# Patient Record
Sex: FEMALE | Race: BLACK OR AFRICAN AMERICAN | NOT HISPANIC OR LATINO | URBAN - METROPOLITAN AREA
[De-identification: names, ages, dates, MRNs, and addresses within clinical notes are randomized per-mention and may not be internally consistent; named-entity substitution may affect disease eponyms.]

---
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
## 2025-01-18 ENCOUNTER — OFFICE VISIT (OUTPATIENT)
Dept: URGENT CARE | Age: 20
End: 2025-01-18
Payer: COMMERCIAL

## 2025-01-18 VITALS
OXYGEN SATURATION: 97 % | SYSTOLIC BLOOD PRESSURE: 106 MMHG | RESPIRATION RATE: 16 BRPM | HEART RATE: 80 BPM | DIASTOLIC BLOOD PRESSURE: 79 MMHG | TEMPERATURE: 98.5 F

## 2025-01-18 DIAGNOSIS — N30.01 ACUTE CYSTITIS WITH HEMATURIA: Primary | ICD-10-CM

## 2025-01-18 DIAGNOSIS — R35.0 URINARY FREQUENCY: ICD-10-CM

## 2025-01-18 DIAGNOSIS — R31.9 HEMATURIA, UNSPECIFIED TYPE: ICD-10-CM

## 2025-01-18 LAB
POC APPEARANCE, URINE: ABNORMAL
POC BILIRUBIN, URINE: NEGATIVE
POC BLOOD, URINE: ABNORMAL
POC COLOR, URINE: ABNORMAL
POC GLUCOSE, URINE: NEGATIVE MG/DL
POC KETONES, URINE: NEGATIVE MG/DL
POC LEUKOCYTES, URINE: ABNORMAL
POC NITRITE,URINE: NEGATIVE
POC PH, URINE: 6 PH
POC PROTEIN, URINE: ABNORMAL MG/DL
POC SPECIFIC GRAVITY, URINE: 1.02
POC UROBILINOGEN, URINE: 0.2 EU/DL
PREGNANCY TEST URINE, POC: NEGATIVE

## 2025-01-18 RX ORDER — PHENAZOPYRIDINE HYDROCHLORIDE 200 MG/1
200 TABLET, FILM COATED ORAL 3 TIMES DAILY PRN
Qty: 10 TABLET | Refills: 0 | Status: SHIPPED | OUTPATIENT
Start: 2025-01-18 | End: 2025-01-21

## 2025-01-18 RX ORDER — NITROFURANTOIN 25; 75 MG/1; MG/1
100 CAPSULE ORAL 2 TIMES DAILY
Qty: 10 CAPSULE | Refills: 0 | Status: SHIPPED | OUTPATIENT
Start: 2025-01-18 | End: 2025-01-23

## 2025-01-18 ASSESSMENT — ENCOUNTER SYMPTOMS
VOMITING: 0
NAUSEA: 0
FEVER: 0
DYSURIA: 1
FLANK PAIN: 1
ABDOMINAL PAIN: 1

## 2025-01-18 NOTE — PROGRESS NOTES
Subjective   Patient ID: Sylwia Aguilar is a 19 y.o. female. They present today with a chief complaint of hematuria today (Family history kidney stones).    History of Present Illness  19-syear-old present with flank pain radiating to the stomach and down to the groins rated 10/10. Pt was concerned for kidney stones as she had family history.       History provided by:  Patient   used: No    Difficulty Urinating  Pain quality:  Burning  Pain severity:  Severe  Onset quality:  Sudden  Duration:  2 days  Timing:  Constant  Progression:  Worsening  Chronicity:  New  Recent urinary tract infections: no    Relieved by:  Nothing  Worsened by:  Nothing  Ineffective treatments:  None tried  Urinary symptoms: frequent urination and hematuria    Urinary symptoms: no discolored urine, no foul-smelling urine, no hesitancy and no bladder incontinence    Associated symptoms: abdominal pain and flank pain    Associated symptoms: no fever, no genital lesions, no nausea, no vaginal discharge and no vomiting        Past Medical History  Allergies as of 01/18/2025 - Reviewed 01/18/2025   Allergen Reaction Noted    Doxycycline Rash 09/28/2024       (Not in a hospital admission)       History reviewed. No pertinent past medical history.    History reviewed. No pertinent surgical history.     reports that she has never smoked. She has never used smokeless tobacco.    Review of Systems  Review of Systems   Constitutional:  Negative for fever.   Gastrointestinal:  Positive for abdominal pain. Negative for nausea and vomiting.   Genitourinary:  Positive for dysuria and flank pain. Negative for vaginal discharge.                                  Objective    Vitals:    01/18/25 1058   BP: 106/79   Pulse: 80   Resp: 16   Temp: 36.9 °C (98.5 °F)   SpO2: 97%     No LMP recorded.    Physical Exam  Vitals and nursing note reviewed.   Constitutional:       Appearance: Normal appearance.   HENT:      Head: Normocephalic and  atraumatic.   Cardiovascular:      Rate and Rhythm: Normal rate and regular rhythm.   Pulmonary:      Effort: Pulmonary effort is normal.      Breath sounds: Normal breath sounds.   Abdominal:      General: Abdomen is protuberant.      Palpations: Abdomen is soft.      Tenderness: There is abdominal tenderness in the suprapubic area. There is right CVA tenderness and left CVA tenderness.   Neurological:      Mental Status: She is alert.         Procedures    Point of Care Test & Imaging Results from this visit  Results for orders placed or performed in visit on 01/18/25   POCT UA Automated manually resulted   Result Value Ref Range    POC Color, Urine Red (A) Straw, Yellow, Light-Yellow    POC Appearance, Urine Cloudy (A) Clear    POC Glucose, Urine NEGATIVE NEGATIVE mg/dl    POC Bilirubin, Urine NEGATIVE NEGATIVE    POC Ketones, Urine NEGATIVE NEGATIVE mg/dl    POC Specific Gravity, Urine 1.020 1.005 - 1.035    POC Blood, Urine LARGE (3+) (A) NEGATIVE    POC PH, Urine 6.0 No Reference Range Established PH    POC Protein, Urine 15 (1+) (A) NEGATIVE mg/dl    POC Urobilinogen, Urine 0.2 0.2, 1.0 EU/DL    Poc Nitrite, Urine NEGATIVE NEGATIVE    POC Leukocytes, Urine MODERATE (2+) (A) NEGATIVE   POCT pregnancy, urine manually resulted   Result Value Ref Range    Preg Test, Ur Negative Negative      No results found.    Diagnostic study results (if any) were reviewed by JORGE Bustos.    Assessment/Plan   Allergies, medications, history, and pertinent labs/EKGs/Imaging reviewed by JORGE Bustos.     Medical Decision Making  Advised to go to the nearest ER for further evaluation of kidney stones as pt might need ultrasound of the urinary tracts.   Pt declined toradol IM.   Treated for UTI as pt had leukocytes and blood in urine.  Urine specimen was sent out for culture and sensitivity. Will adjust the antibiotic treatment as needed.   Take the antibiotic with food.  Eat yogurt or take probiotic once a day.   Symptoms should improve in 2 to 3 days.   --- Drink a lot of fluid, 3 to 4 quarts a day. Cranberry juice is especially recommended, in addition to large amounts of water.  --- Avoid alcohol caffeine, tea and carbonated beverages (Coke®, 7-Up®, etc). They tend to irritate the bladder.  --- Ibuprofen (Advil® or Motrin®) or acetaminophen (Tylenol®) may be used for temperature and/or discomfort.  To prevent severe infection, follow up immediately if you:  --- Develop severe back pain or lower abdominal pain.  --- Develop chills and fever.  --- Develop nausea or vomiting.  --- Have continued burning or discomfort with urination.  Pt verbalized understanding of the instructions and left in stable condition.      Orders and Diagnoses  Diagnoses and all orders for this visit:  Acute cystitis with hematuria  -     nitrofurantoin, macrocrystal-monohydrate, (Macrobid) 100 mg capsule; Take 1 capsule (100 mg) by mouth 2 times a day for 5 days.  -     phenazopyridine (Pyridium) 200 mg tablet; Take 1 tablet (200 mg) by mouth 3 times a day as needed for bladder spasms for up to 3 days.  Hematuria, unspecified type  -     POCT UA Automated manually resulted  -     POCT pregnancy, urine manually resulted  Urinary frequency  -     POCT UA Automated manually resulted  -     POCT pregnancy, urine manually resulted  -     phenazopyridine (Pyridium) 200 mg tablet; Take 1 tablet (200 mg) by mouth 3 times a day as needed for bladder spasms for up to 3 days.      Medical Admin Record      Patient disposition: ED    Electronically signed by JORGE Bustos  11:15 AM

## 2025-05-09 ENCOUNTER — OFFICE VISIT (OUTPATIENT)
Dept: URGENT CARE | Age: 20
End: 2025-05-09
Payer: COMMERCIAL

## 2025-05-09 VITALS
HEART RATE: 77 BPM | TEMPERATURE: 98.6 F | OXYGEN SATURATION: 98 % | DIASTOLIC BLOOD PRESSURE: 77 MMHG | RESPIRATION RATE: 16 BRPM | SYSTOLIC BLOOD PRESSURE: 125 MMHG

## 2025-05-09 DIAGNOSIS — N30.01 ACUTE CYSTITIS WITH HEMATURIA: Primary | ICD-10-CM

## 2025-05-09 LAB
POC APPEARANCE, URINE: ABNORMAL
POC BILIRUBIN, URINE: NEGATIVE
POC BLOOD, URINE: ABNORMAL
POC COLOR, URINE: YELLOW
POC GLUCOSE, URINE: NEGATIVE MG/DL
POC KETONES, URINE: NEGATIVE MG/DL
POC LEUKOCYTES, URINE: ABNORMAL
POC NITRITE,URINE: NEGATIVE
POC PH, URINE: 6 PH
POC PROTEIN, URINE: ABNORMAL MG/DL
POC SPECIFIC GRAVITY, URINE: 1.02
POC UROBILINOGEN, URINE: 0.2 EU/DL

## 2025-05-09 RX ORDER — NITROFURANTOIN 25; 75 MG/1; MG/1
100 CAPSULE ORAL 2 TIMES DAILY
Qty: 10 CAPSULE | Refills: 0 | Status: SHIPPED | OUTPATIENT
Start: 2025-05-09 | End: 2025-05-14

## 2025-05-09 ASSESSMENT — ENCOUNTER SYMPTOMS
DYSURIA: 1
VOMITING: 0
FLANK PAIN: 0
FEVER: 0
NAUSEA: 0
ABDOMINAL PAIN: 1

## 2025-05-09 NOTE — PROGRESS NOTES
Subjective   Patient ID: Sylwia Aguilar is a 19 y.o. female. They present today with a chief complaint of urinary frequency, urgency.    History of Present Illness  Pt started metformin about a month. She recently started a new oral contraceptive. She noticed her UTI episodes become more frequent thereafter and requests for a referral to urologist.       History provided by:  Patient   used: No    Difficulty Urinating  Pain quality:  Burning  Pain severity:  Moderate  Onset quality:  Gradual  Duration:  3 days  Timing:  Constant  Progression:  Worsening  Chronicity:  New  Recent urinary tract infections: no    Relieved by:  Nothing  Worsened by:  Nothing  Ineffective treatments:  Phenazopyridine  Urinary symptoms: no discolored urine, no foul-smelling urine, no frequent urination, no hematuria, no hesitancy and no bladder incontinence    Associated symptoms: abdominal pain    Associated symptoms: no fever, no flank pain, no genital lesions, no nausea, no vaginal discharge and no vomiting        Past Medical History  Allergies as of 05/09/2025 - Reviewed 05/09/2025   Allergen Reaction Noted    Penicillins Other, Rash, and Seizure 05/09/2025    Doxycycline Rash 09/28/2024       Prescriptions Prior to Admission[1]     Medical History[2]    Surgical History[3]     reports that she has never smoked. She has never used smokeless tobacco.    Review of Systems  Review of Systems   Constitutional:  Negative for fever.   Gastrointestinal:  Positive for abdominal pain. Negative for nausea and vomiting.   Genitourinary:  Positive for dysuria. Negative for flank pain and vaginal discharge.                                  Objective    Vitals:    05/09/25 1537   BP: 125/77   Pulse: 77   Resp: 16   Temp: 37 °C (98.6 °F)   SpO2: 98%     No LMP recorded.    Physical Exam  Vitals and nursing note reviewed.   Constitutional:       Appearance: Normal appearance.   HENT:      Head: Normocephalic and atraumatic.    Cardiovascular:      Rate and Rhythm: Normal rate and regular rhythm.   Pulmonary:      Effort: Pulmonary effort is normal.      Breath sounds: Normal breath sounds.   Abdominal:      General: Abdomen is protuberant.      Tenderness: There is abdominal tenderness in the suprapubic area. There is no right CVA tenderness or left CVA tenderness.   Neurological:      Mental Status: She is alert.         Procedures    Point of Care Test & Imaging Results from this visit  Results for orders placed or performed in visit on 05/09/25   POCT UA Automated manually resulted   Result Value Ref Range    POC Color, Urine Yellow Straw, Yellow, Light-Yellow    POC Appearance, Urine Cloudy (A) Clear    POC Glucose, Urine NEGATIVE NEGATIVE mg/dl    POC Bilirubin, Urine NEGATIVE NEGATIVE    POC Ketones, Urine NEGATIVE NEGATIVE mg/dl    POC Specific Gravity, Urine 1.025 1.005 - 1.035    POC Blood, Urine LARGE (3+) (A) NEGATIVE    POC PH, Urine 6.0 No Reference Range Established PH    POC Protein, Urine 15 (1+) (A) NEGATIVE mg/dl    POC Urobilinogen, Urine 0.2 0.2, 1.0 EU/DL    Poc Nitrite, Urine NEGATIVE NEGATIVE    POC Leukocytes, Urine SMALL (1+) (A) NEGATIVE      Imaging  No results found.    Cardiology, Vascular, and Other Imaging  No other imaging results found for the past 2 days      Diagnostic study results (if any) were reviewed by JORGE Bustos.    Assessment/Plan   Allergies, medications, history, and pertinent labs/EKGs/Imaging reviewed by JORGE Bustos.     Medical Decision Making  Urine specimen was sent out for culture and sensitivity. Will adjust the antibiotic treatment as needed.   Take the antibiotic with food.  Eat yogurt or take probiotic once a day.  Symptoms should improve in 2 to 3 days.   --- Drink a lot of fluid, 3 to 4 quarts a day. Cranberry juice is especially recommended, in addition to large amounts of water.  --- Avoid alcohol caffeine, tea and carbonated beverages (Coke®, 7-Up®, etc).  They tend to irritate the bladder.  --- Ibuprofen (Advil® or Motrin®) or acetaminophen (Tylenol®) may be used for temperature and/or discomfort.  To prevent severe infection, follow up immediately if you:  --- Develop severe back pain or lower abdominal pain.  --- Develop chills and fever.  --- Develop nausea or vomiting.  --- Have continued burning or discomfort with urination.    Referred to urology for frequent UTI.    Pt verbalized understanding of the instructions and left in stable condition.    Orders and Diagnoses  Diagnoses and all orders for this visit:  Acute cystitis with hematuria  -     Urine Culture  -     POCT UA Automated manually resulted  -     Referral to Urology; Future  -     nitrofurantoin, macrocrystal-monohydrate, (Macrobid) 100 mg capsule; Take 1 capsule (100 mg) by mouth 2 times a day for 5 days.      Medical Admin Record      Patient disposition: Home    Electronically signed by JORGE Bustos  3:51 PM           [1] (Not in a hospital admission)   [2] No past medical history on file.  [3] No past surgical history on file.

## 2025-05-11 LAB — BACTERIA UR CULT: ABNORMAL

## 2025-05-12 LAB — BACTERIA UR CULT: ABNORMAL
